# Patient Record
Sex: FEMALE | Race: WHITE | Employment: FULL TIME | URBAN - METROPOLITAN AREA
[De-identification: names, ages, dates, MRNs, and addresses within clinical notes are randomized per-mention and may not be internally consistent; named-entity substitution may affect disease eponyms.]

---

## 2020-04-30 ENCOUNTER — TELEPHONE (OUTPATIENT)
Dept: FAMILY MEDICINE CLINIC | Age: 35
End: 2020-04-30

## 2020-05-01 NOTE — TELEPHONE ENCOUNTER
Mother and baby scheduled for Tuesday afternoon with Dr. Addie Mireles. Baby is bottle feeding and doing well, no concerns.

## 2020-05-05 ENCOUNTER — OFFICE VISIT (OUTPATIENT)
Dept: PRIMARY CARE CLINIC | Age: 35
End: 2020-05-05
Payer: COMMERCIAL

## 2020-05-05 VITALS
WEIGHT: 173.4 LBS | OXYGEN SATURATION: 99 % | SYSTOLIC BLOOD PRESSURE: 120 MMHG | BODY MASS INDEX: 31.91 KG/M2 | DIASTOLIC BLOOD PRESSURE: 86 MMHG | HEART RATE: 67 BPM | HEIGHT: 62 IN

## 2020-05-05 PROCEDURE — G8417 CALC BMI ABV UP PARAM F/U: HCPCS | Performed by: FAMILY MEDICINE

## 2020-05-05 PROCEDURE — 99202 OFFICE O/P NEW SF 15 MIN: CPT | Performed by: FAMILY MEDICINE

## 2020-05-05 PROCEDURE — G8427 DOCREV CUR MEDS BY ELIG CLIN: HCPCS | Performed by: FAMILY MEDICINE

## 2020-05-05 PROCEDURE — 1036F TOBACCO NON-USER: CPT | Performed by: FAMILY MEDICINE

## 2020-05-05 ASSESSMENT — PATIENT HEALTH QUESTIONNAIRE - PHQ9
SUM OF ALL RESPONSES TO PHQ9 QUESTIONS 1 & 2: 0
1. LITTLE INTEREST OR PLEASURE IN DOING THINGS: 0
SUM OF ALL RESPONSES TO PHQ QUESTIONS 1-9: 0
2. FEELING DOWN, DEPRESSED OR HOPELESS: 0
SUM OF ALL RESPONSES TO PHQ QUESTIONS 1-9: 0

## 2020-05-05 NOTE — PROGRESS NOTES
Subjective:      Patient ID: Polly Ruffin is a 36 y.o. female. RODOLFO   Pt is a of 36 y.o. female comes in today with   Chief Complaint   Patient presents with   Yvette Brewer Establish Care     Patient needs to re-establish care, no concerns. Has been on metformin in the past for pcos but negative glucose screen during pregnancy. Strong fam h/o hypertension   Diet good. No concerns today  No Known Allergies    Current Outpatient Medications on File Prior to Visit   Medication Sig Dispense Refill    ibuprofen (ADVIL;MOTRIN) 800 MG tablet Take 1 tablet by mouth every 6 hours as needed for Pain 120 tablet 3    [DISCONTINUED] Prenatal Vit-Fe Fumarate-FA (PRENATAL VITAMINS) 28-0.8 MG TABS Take 1 tablet by mouth daily. 90 tablet 3     No current facility-administered medications on file prior to visit. Past Medical History:   Diagnosis Date    Migraines     ibuprofen 800mg prn       No past surgical history on file.     Social History     Socioeconomic History    Marital status:      Spouse name: None    Number of children: None    Years of education: None    Highest education level: None   Occupational History    None   Social Needs    Financial resource strain: None    Food insecurity     Worry: None     Inability: None    Transportation needs     Medical: None     Non-medical: None   Tobacco Use    Smoking status: Never Smoker    Smokeless tobacco: Never Used   Substance and Sexual Activity    Alcohol use: No    Drug use: No    Sexual activity: Yes     Partners: Male   Lifestyle    Physical activity     Days per week: None     Minutes per session: None    Stress: None   Relationships    Social connections     Talks on phone: None     Gets together: None     Attends Oriental orthodox service: None     Active member of club or organization: None     Attends meetings of clubs or organizations: None     Relationship status: None    Intimate partner violence     Fear of current or ex partner: None

## 2020-07-23 ENCOUNTER — TELEPHONE (OUTPATIENT)
Dept: FAMILY MEDICINE CLINIC | Age: 35
End: 2020-07-23

## 2020-07-23 NOTE — TELEPHONE ENCOUNTER
----- Message from emily Goran sent at 7/23/2020  7:56 AM EDT -----  Subject: Appointment Request    QUESTIONS  Reason for appointment request? Other - disposition said Urgent but the   patient wants to be seen sometime next week after 2pm Please call  ---------------------------------------------------------------------------  --------------  CALL BACK INFO  What is the best way for the office to contact you? OK to leave message on   voicemail  Preferred Call Back Phone Number? [phone]  ---------------------------------------------------------------------------  --------------  SCRIPT ANSWERS  Relationship to Patient? Self  Appointment reason? Symptomatic  Select script based on patient symptoms? Adult Back or Neck Pain [Slipped   disc   Herniated disc   sciatica]  Did you have an injury or trauma within the past 3 days? No  Are you having numbness   tingling   or weakness in your arms and/or legs with this pain? No  Are you having new problems with your bowel or bladder control? No  Are you having fevers (100.4)   chills   or sweats? No  Did your pain begin within the past 14 days? Yes  Is your pain affecting your daily activities or employment? No  Have you been diagnosed with   tested for   or told that you are suspected of having COVID-19 (Coronavirus)? No  Have you had a fever or taken medication to treat a fever within the past   3 days? No  Have you had a cough   shortness of breath or flu-like symptoms within the past 3 days? No  Do you currently have flu-like symptoms including fever or chills   cough   shortness of breath   or difficulty breathing   or new loss of taste or smell? No  (Service Expert  click yes below to proceed with [employer] [facility] As Usual   Scheduling)?  Yes

## 2020-07-28 ENCOUNTER — OFFICE VISIT (OUTPATIENT)
Dept: FAMILY MEDICINE CLINIC | Age: 35
End: 2020-07-28
Payer: COMMERCIAL

## 2020-07-28 VITALS
TEMPERATURE: 98.1 F | DIASTOLIC BLOOD PRESSURE: 76 MMHG | BODY MASS INDEX: 31.54 KG/M2 | HEIGHT: 62 IN | HEART RATE: 74 BPM | SYSTOLIC BLOOD PRESSURE: 118 MMHG | WEIGHT: 171.4 LBS | OXYGEN SATURATION: 98 %

## 2020-07-28 PROBLEM — B97.7 HPV IN FEMALE: Chronic | Status: ACTIVE | Noted: 2020-07-28

## 2020-07-28 PROBLEM — B97.7 HPV IN FEMALE: Status: ACTIVE | Noted: 2020-07-28

## 2020-07-28 PROBLEM — E78.5 HYPERLIPIDEMIA: Chronic | Status: ACTIVE | Noted: 2020-07-28

## 2020-07-28 PROBLEM — E78.5 HYPERLIPIDEMIA: Status: ACTIVE | Noted: 2020-07-28

## 2020-07-28 PROCEDURE — 1036F TOBACCO NON-USER: CPT | Performed by: FAMILY MEDICINE

## 2020-07-28 PROCEDURE — 99213 OFFICE O/P EST LOW 20 MIN: CPT | Performed by: FAMILY MEDICINE

## 2020-07-28 PROCEDURE — G8417 CALC BMI ABV UP PARAM F/U: HCPCS | Performed by: FAMILY MEDICINE

## 2020-07-28 PROCEDURE — G8427 DOCREV CUR MEDS BY ELIG CLIN: HCPCS | Performed by: FAMILY MEDICINE

## 2020-07-28 RX ORDER — CYCLOBENZAPRINE HCL 10 MG
10 TABLET ORAL 3 TIMES DAILY PRN
Qty: 30 TABLET | Refills: 0 | Status: SHIPPED | OUTPATIENT
Start: 2020-07-28 | End: 2020-08-07

## 2020-07-28 ASSESSMENT — ENCOUNTER SYMPTOMS
CHEST TIGHTNESS: 0
DIARRHEA: 0
BACK PAIN: 1
RHINORRHEA: 0
ABDOMINAL PAIN: 0
SORE THROAT: 0
CONSTIPATION: 0
SHORTNESS OF BREATH: 0

## 2020-07-28 NOTE — PROGRESS NOTES
Subjective:   Patient ID: Samuel Salcedo is a 30 y.o. female. HPI by clinical support staff:   Patient complains of back pain for about 1 month, unaware of injury. Feels tight. Went to see chiropractor. States that her spine is going to the right. Severity: ranges from 3-10   Radiation: radiates to different parts of the body   Worse with: when sitting still   Improves with: going to chiropractor but is getting too expensive    Preliminary data above this line collected by clinical support staff.    ______________________________________________________________________     HPI by Provider:   HPI   Patient presents today for upper back pain. Onset: 1 month ago- shankar snot recall any injury but had 4th baby 4 months ago. Duration: intermittent for hours. Location: upper thorax  Severity: moderate-severe. But 3/10 now  Radiation: down her spine and flanks  Associated with: numbness sometimes  Worse with: some movements  Improves with: ice and rest  Treatment so far: chiropractor and ice. Xray showed spine curvature per patient. Data above this line collected by Provider. Patient's medications, allergies, past medical, surgical, social and family histories were reviewed and updated as appropriate. Patient Care Team:  Mariajose Alaniz MD as PCP - General (Family Medicine)  Mariajose Alaniz MD as PCP - [address] [place] [address] Dr Mendezled Provider  Merrill Bates MD (Family Medicine)    Current Outpatient Medications on File Prior to Visit   Medication Sig Dispense Refill    [DISCONTINUED] Prenatal Vit-Fe Fumarate-FA (PRENATAL VITAMINS) 28-0.8 MG TABS Take 1 tablet by mouth daily. 90 tablet 3     No current facility-administered medications on file prior to visit. Review of Systems   Constitutional: Negative for activity change, appetite change, fatigue and fever. HENT: Negative for congestion, rhinorrhea and sore throat. Respiratory: Negative for chest tightness and shortness of breath.     Cardiovascular: Negative for chest pain, palpitations and leg swelling. Gastrointestinal: Negative for abdominal pain, constipation and diarrhea. Genitourinary: Negative for dysuria and frequency. Musculoskeletal: Positive for back pain, neck pain and neck stiffness. Negative for arthralgias. Neurological: Negative for dizziness, weakness and headaches. Psychiatric/Behavioral: Negative for hallucinations. All other systems reviewed and are negative. ROS above this line reviewed by Provider. Objective:   /76 (Site: Left Upper Arm, Position: Sitting, Cuff Size: Large Adult)   Pulse 74   Temp 98.1 °F (36.7 °C) (Infrared)   Ht 5' 2\" (1.575 m)   Wt 171 lb 6.4 oz (77.7 kg)   LMP 07/25/2020 (Approximate)   SpO2 98%   BMI 31.35 kg/m²   Physical Exam  Vitals signs and nursing note reviewed. Constitutional:       General: She is not in acute distress. Appearance: Normal appearance. She is normal weight. She is not ill-appearing, toxic-appearing or diaphoretic. HENT:      Head: Normocephalic and atraumatic. Eyes:      General: No scleral icterus. Conjunctiva/sclera: Conjunctivae normal.   Neck:      Musculoskeletal: Normal range of motion. Cardiovascular:      Rate and Rhythm: Normal rate and regular rhythm. Heart sounds: Normal heart sounds. No murmur. No friction rub. No gallop. Pulmonary:      Effort: Pulmonary effort is normal.   Musculoskeletal: Normal range of motion. Back:    Skin:     General: Skin is warm and dry. Neurological:      Mental Status: She is alert. Psychiatric:         Mood and Affect: Mood normal.         Behavior: Behavior normal.       Assessment and Plan:   1. Thoracic myofascial strain, initial encounter  Will obtain xray from chiropractor- BIANCA signed. Suspect scoliosis causing strain- advised therapy, muscle relaxer and NSAID use prn. Will contact patient if xray shows otherwise.   - [facility]  - cyclobenzaprine (FLEXERIL) 10 MG tablet; Take 1 tablet by mouth 3 times daily as needed for Muscle spasms  Dispense: 30 tablet; Refill: 0       Electronically signed by   Mustapha Wolfe MD  7/28/2020   3:32 PM    Return in about 4 weeks (around 8/25/2020) for Back pain .

## 2020-07-28 NOTE — PATIENT INSTRUCTIONS
Real Caicedo,  It was a pleasure meeting you today. As discussed:  ·  I have placed a referral for physical therapy, please call them if you do not hear from them in 7 days. · I have sent in the prescriptions as discussed to your pharmacy, you can call your pharmacy for all refill requests. · Medication may make you sleepy. · You may use Advil or Aleve (2 tablets twice daily) for pain and try to stay well hydrated. Please do not hesitate to call or send a message if you have questions or concerns. Our goal is to ensure your complete wellbeing. Enjoy the rest of the week    Dr Clarice Rehman   Patient Education        Upper and Middle Back (Thoracic) Strain: Care Instructions  Overview     A back strain happens when you overstretch, or pull, a muscle in your back. You may hurt your back in an accident or when you exercise or lift something. Sometimes you may not know how you hurt your back. Most back pain will get better with rest and time. You can take care of yourself at home to help your back heal.  Follow-up care is a key part of your treatment and safety. Be sure to make and go to all appointments, and call your doctor if you are having problems. It's also a good idea to know your test results and keep a list of the medicines you take. How can you care for yourself at home? · Try to stay as active as you can, but stop or reduce any activity that causes pain. · You can try using heat or ice to see if it helps. ? Try using a heating pad on a low or medium setting for 15 to 20 minutes every 2 to 3 hours. Try a warm shower in place of one session with the heating pad. You can also buy single-use heat wraps that last up to 8 hours. ? You can also try an ice pack for 10 to 15 minutes every 2 to 3 hours. Put a thin cloth between the ice and your skin. · Be safe with medicines. Read and follow all instructions on the label.   ? If the doctor gave you a prescription medicine for pain, take it as

## 2020-08-05 ENCOUNTER — HOSPITAL ENCOUNTER (OUTPATIENT)
Dept: PHYSICAL THERAPY | Age: 35
Setting detail: THERAPIES SERIES
Discharge: HOME | End: 2020-08-05
Payer: COMMERCIAL

## 2020-08-05 NOTE — FLOWSHEET NOTE
[facility]    Physical Therapy  Cancellation/No-show Note  Patient Name:  Shanae Francois  :  1985   Date:  2020  Cancelled visits to date: 0  No-shows to date:1    For today's appointment patient:  []  Cancelled  []  Rescheduled appointment  [x]  No-show     Reason given by patient:  []  Patient ill  []  Conflicting appointment   []  No transportation    []  Conflict with work  [x]  No reason given  []  Other:     Comments:      Electronically signed by:  Imani Dial, PT, DPT, OCS

## 2020-10-11 NOTE — TELEPHONE ENCOUNTER
Pt delivered a baby last night and would like the baby to see Dr. Lenora Balderas. Please call and advise whether or not the  has to be seen in the [facility] [place] office or can do a VV. Mother hasn't been seen since  and would have to start over as a New Pt as well. rx keflex

## 2021-02-12 ENCOUNTER — PATIENT MESSAGE (OUTPATIENT)
Dept: FAMILY MEDICINE CLINIC | Age: 36
End: 2021-02-12

## 2021-02-12 NOTE — TELEPHONE ENCOUNTER
From: Renan Mata  To: Zuri Durham MD  Sent: 2/12/2021 1:19 PM EST  Subject: Non-Urgent Medical Question    Is there a email I can send you x-rays from my chiropractor.

## 2021-02-12 NOTE — TELEPHONE ENCOUNTER
Patient called to make sure the x-ray images were received. She would like our opinion on what this may be and what to do. She has an appointment with a specialist on Wednesday, February 17 and they have informed her they need a diagnosis. Please call patient to advise.

## 2021-08-17 ENCOUNTER — TELEPHONE (OUTPATIENT)
Dept: FAMILY MEDICINE CLINIC | Age: 36
End: 2021-08-17
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
